# Patient Record
Sex: FEMALE | Race: WHITE | Employment: OTHER | URBAN - METROPOLITAN AREA
[De-identification: names, ages, dates, MRNs, and addresses within clinical notes are randomized per-mention and may not be internally consistent; named-entity substitution may affect disease eponyms.]

---

## 2017-01-18 ENCOUNTER — HOSPITAL ENCOUNTER (OUTPATIENT)
Dept: MAMMOGRAPHY | Age: 47
Discharge: HOME | End: 2017-01-18
Attending: OBSTETRICS & GYNECOLOGY
Payer: COMMERCIAL

## 2017-01-18 DIAGNOSIS — Z12.31 VISIT FOR SCREENING MAMMOGRAM: ICD-10-CM

## 2017-01-18 PROCEDURE — 77067 SCR MAMMO BI INCL CAD: CPT

## 2018-05-15 ENCOUNTER — HOSPITAL ENCOUNTER (OUTPATIENT)
Dept: MAMMOGRAPHY | Age: 48
Discharge: HOME | End: 2018-05-15
Attending: OBSTETRICS & GYNECOLOGY
Payer: COMMERCIAL

## 2018-05-15 DIAGNOSIS — Z12.31 VISIT FOR SCREENING MAMMOGRAM: ICD-10-CM

## 2018-05-15 PROCEDURE — 77067 SCR MAMMO BI INCL CAD: CPT

## 2019-06-14 ENCOUNTER — HOSPITAL ENCOUNTER (OUTPATIENT)
Dept: MAMMOGRAPHY | Age: 49
Discharge: HOME | End: 2019-06-14
Attending: OBSTETRICS & GYNECOLOGY

## 2019-06-14 DIAGNOSIS — Z12.39 BREAST SCREENING, UNSPECIFIED: ICD-10-CM

## 2020-01-03 PROBLEM — R51.9 HEADACHE DISORDER: Status: ACTIVE | Noted: 2020-01-03

## 2020-01-03 PROBLEM — Z79.899 ENCOUNTER FOR MEDICATION MANAGEMENT: Status: ACTIVE | Noted: 2020-01-03

## 2020-01-03 PROBLEM — G47.09 OTHER INSOMNIA: Status: ACTIVE | Noted: 2020-01-03

## 2020-01-03 PROBLEM — G43.111 INTRACTABLE MIGRAINE WITH AURA WITH STATUS MIGRAINOSUS: Status: ACTIVE | Noted: 2020-01-03

## 2020-08-11 ENCOUNTER — HOSPITAL ENCOUNTER (OUTPATIENT)
Dept: MAMMOGRAPHY | Age: 50
Discharge: HOME | End: 2020-08-11
Attending: OBSTETRICS & GYNECOLOGY
Payer: MEDICARE

## 2020-08-11 DIAGNOSIS — Z12.31 VISIT FOR SCREENING MAMMOGRAM: ICD-10-CM

## 2020-08-11 PROCEDURE — 77067 SCR MAMMO BI INCL CAD: CPT

## 2021-01-05 PROBLEM — L65.9 HAIR LOSS: Status: ACTIVE | Noted: 2021-01-05

## 2021-01-05 PROBLEM — Z78.0 POSTMENOPAUSAL: Status: ACTIVE | Noted: 2021-01-05

## 2021-01-05 PROBLEM — E55.9 VITAMIN D DEFICIENCY: Status: ACTIVE | Noted: 2021-01-05

## 2021-05-11 ENCOUNTER — HOSPITAL ENCOUNTER (OUTPATIENT)
Dept: LAB | Age: 51
Discharge: HOME | End: 2021-05-11

## 2021-05-11 PROCEDURE — 88305 TISSUE EXAM BY PATHOLOGIST: CPT

## 2021-07-29 ENCOUNTER — TRANSCRIBE ORDER (OUTPATIENT)
Dept: SCHEDULING | Age: 51
End: 2021-07-29

## 2021-07-29 DIAGNOSIS — Z12.31 VISIT FOR SCREENING MAMMOGRAM: Primary | ICD-10-CM

## 2021-08-26 ENCOUNTER — HOSPITAL ENCOUNTER (OUTPATIENT)
Dept: MAMMOGRAPHY | Age: 51
Discharge: HOME | End: 2021-08-26
Attending: NURSE PRACTITIONER
Payer: MEDICARE

## 2021-08-26 DIAGNOSIS — Z12.31 VISIT FOR SCREENING MAMMOGRAM: ICD-10-CM

## 2021-08-26 PROCEDURE — 77063 BREAST TOMOSYNTHESIS BI: CPT

## 2022-03-18 PROBLEM — R51.9 HEADACHE DISORDER: Status: ACTIVE | Noted: 2020-01-03

## 2022-03-18 PROBLEM — G47.09 OTHER INSOMNIA: Status: ACTIVE | Noted: 2020-01-03

## 2022-03-19 PROBLEM — Z78.0 POSTMENOPAUSAL: Status: ACTIVE | Noted: 2021-01-05

## 2022-03-19 PROBLEM — Z79.899 ENCOUNTER FOR MEDICATION MANAGEMENT: Status: ACTIVE | Noted: 2020-01-03

## 2022-03-19 PROBLEM — G43.111 INTRACTABLE MIGRAINE WITH AURA WITH STATUS MIGRAINOSUS: Status: ACTIVE | Noted: 2020-01-03

## 2022-03-19 PROBLEM — L65.9 HAIR LOSS: Status: ACTIVE | Noted: 2021-01-05

## 2022-03-20 PROBLEM — E55.9 VITAMIN D DEFICIENCY: Status: ACTIVE | Noted: 2021-01-05

## 2022-06-02 DIAGNOSIS — E03.9 PRIMARY HYPOTHYROIDISM: Primary | ICD-10-CM

## 2022-07-27 ENCOUNTER — HOSPITAL ENCOUNTER (OUTPATIENT)
Dept: LAB | Age: 52
Discharge: HOME | End: 2022-07-30

## 2022-07-27 PROCEDURE — 88312 SPECIAL STAINS GROUP 1: CPT

## 2022-07-27 PROCEDURE — 88305 TISSUE EXAM BY PATHOLOGIST: CPT

## 2022-08-03 ENCOUNTER — NURSE ONLY (OUTPATIENT)
Dept: ENDOCRINOLOGY | Age: 52
End: 2022-08-03

## 2022-08-03 DIAGNOSIS — E03.9 PRIMARY HYPOTHYROIDISM: ICD-10-CM

## 2022-08-03 LAB
25(OH)D3 SERPL-MCNC: 34.3 NG/ML (ref 30–100)
T4 FREE SERPL-MCNC: 1.4 NG/DL (ref 0.78–1.46)
TSH, 3RD GENERATION: 1.11 UIU/ML (ref 0.36–3.74)

## 2022-08-10 ENCOUNTER — TELEMEDICINE (OUTPATIENT)
Dept: ENDOCRINOLOGY | Age: 52
End: 2022-08-10
Payer: MEDICARE

## 2022-08-10 DIAGNOSIS — E06.3 HASHIMOTO'S THYROIDITIS: ICD-10-CM

## 2022-08-10 DIAGNOSIS — E55.9 VITAMIN D DEFICIENCY: ICD-10-CM

## 2022-08-10 DIAGNOSIS — E03.9 PRIMARY HYPOTHYROIDISM: Primary | ICD-10-CM

## 2022-08-10 PROCEDURE — 99214 OFFICE O/P EST MOD 30 MIN: CPT | Performed by: INTERNAL MEDICINE

## 2022-08-10 RX ORDER — LEVOTHYROXINE SODIUM 0.07 MG/1
75 TABLET ORAL
Qty: 90 TABLET | Refills: 3 | Status: SHIPPED | OUTPATIENT
Start: 2022-08-10

## 2022-08-10 ASSESSMENT — ENCOUNTER SYMPTOMS
CONSTIPATION: 1
DIARRHEA: 0

## 2022-08-10 NOTE — PROGRESS NOTES
Erika Leon MD, [address] [facility]            Reason for visit: Follow-up of hypothyroidism    I was in the office while conducting this encounter. Consent:  Larry Oleary, who was evaluated through a synchronous (real-time) audio-video encounter, and/or her healthcare decision maker, is aware that it is a billable service, which includes applicable co-pays, with coverage as determined by her insurance carrier. She provided verbal consent to proceed and patient identification was verified. This visit was conducted pursuant to the emergency declaration under the [address] [place] [address], [address] [facility] authority and the [employer] Act. A caregiver was present when appropriate. Ability to conduct physical exam was limited. The patient was located at home in a state where the provider was licensed to provide care. This virtual visit was conducted via [facility] Pursuant to the emergency declaration under the [address] [place] [address], [address] [facility] authority and the [employer] and [employer] Act, this Virtual  Visit was conducted to reduce the patient's risk of exposure to COVID-19 and provide continuity of care for an established patient. Services were provided through a video synchronous discussion virtually to substitute for in-person clinic visit. Due to this being a TeleHealth evaluation, many elements of the physical examination are unable to be assessed. --Bianca Matson MD on 8/10/2022 at 8:50 AM        ASSESSMENT AND PLAN:    1. Primary hypothyroidism  Larry Oleary is biochemically euthyroid. Continue thyroid hormone replacement as prescribed. Residual symptoms are attributable to something other than thyroid dysfunction. Repeat thyroid function tests prior to the next appointment (6 months).   - levothyroxine (SYNTHROID) 75 MCG tablet; Take 1 tablet by mouth every morning (before breakfast)  Dispense: 90 tablet; Refill: 3  - TSH; Future  - T4, Free; Future    2. Hashimoto's thyroiditis    3. Vitamin D deficiency  Vitamin D is replete. Continue vitamin D replacement as prescribed. RTC 6 months.  - vitamin D 25 MCG (1000 UT) CAPS; Take 4 capsules by mouth in the morning. Dispense: 360 capsule; Refill: 3  - Vitamin D 25 Hydroxy; Future    Follow-up and Dispositions    Return in about 6 months (around 2/10/2023). History of Present Illness:    THYROID DYSFUNCTION  Carrington Ramsey was diagnosed with hypothyroidism in  following the birth of her daughter. Symptoms: See review of systems below. Pregnancy / menstrual history: She is A1. Her last menstrual cycle was ~age 43. Prior treatment: She was started on on levothyroxine 75 mcg daily since diagnosis. Her dose has been adjusted as follows:  -100 mcg daily 3/18/2016  -88 mcg daily 2016  -75 mcg daily 2016  -75 mcg daily 6 days per week and 112.5 mcg daily 1 day per week 2016  -75 mcg daily 10/26/2020    Pertinent labs:  10/24/2014: TSH 15.05.  2014: TSH 3.51.  3/18/2015: TSH 4.36.  2016: TSH 9.350.  2016: TSH 0.071, free T4 1.59, antithyroid peroxidase antibodies 22 (+).  2016: TSH 0.049, free T4 1.75.  2016: TSH 4.940, free T4 1. 19.    11/3/2016: TSH 2.270, free T4 1.71.    2017: TSH 0.702, free T4 1.45.  10/11/2017: TSH 2.320, free T4 1.39.  2020: TSH 1.510.  10/26/2020: TSH 0.408.  2021: TSH 2.210, free T4 1.42, 25-hydroxy vitamin D 39.3.  2021: TSH 2.850, free T4 1.41, 25-hydroxy vitamin D 36.0.  2021: TSH 0.577.  2022: TSH 1.390, free T4 2.09, 25-hydroxy vitamin D 39.4.  8/3/2022: TSH 1.110, free T4 1.4, 25-hydroxy vitamin D 34.3. Imaging:  None    Review of Systems   Constitutional:  Positive for fatigue (mild / chronic).  Negative for unexpected weight change (intentionally lost ~4 pounds recently). Cardiovascular:  Negative for palpitations. Gastrointestinal:  Positive for constipation (chronic). Negative for diarrhea. There were no vitals taken for this visit. Wt Readings from Last 3 Encounters:   04/13/22 148 lb (67.1 kg)   08/04/21 145 lb 6.4 oz (66 kg)       Physical Exam  Constitutional:       Appearance: Normal appearance. HENT:      Head: Normocephalic. Nose: Nose normal.   Eyes:      Extraocular Movements: Extraocular movements intact. Pulmonary:      Effort: Pulmonary effort is normal.   Musculoskeletal:         General: Normal range of motion. Right shoulder: Normal.      Left shoulder: Normal.      Cervical back: Normal range of motion. Skin:     Coloration: Skin is not jaundiced or pale. Neurological:      General: No focal deficit present. Mental Status: She is alert. Mental status is at baseline. Psychiatric:         Mood and Affect: Mood normal.         Behavior: Behavior normal.       Orders Placed This Encounter   Procedures    TSH     Standing Status:   Future     Standing Expiration Date:   8/10/2023    T4, Free     Standing Status:   Future     Standing Expiration Date:   8/10/2023    Vitamin D 25 Hydroxy     Standing Status:   Future     Standing Expiration Date:   8/10/2023         Current Outpatient Medications   Medication Sig Dispense Refill    levothyroxine (SYNTHROID) 75 MCG tablet Take 1 tablet by mouth every morning (before breakfast) 90 tablet 3    vitamin D 25 MCG (1000 UT) CAPS Take 4 capsules by mouth in the morning.  360 capsule 3    BABY ASPIRIN PO Take by mouth      ascorbic acid (VITAMIN C) 500 MG tablet Take by mouth      estradiol (ESTRACE) 0.1 MG/GM vaginal cream Place 1 g vaginally Twice a Week      fexofenadine (ALLEGRA) 180 MG tablet Take by mouth daily as needed      guaiFENesin 1200 MG TB12 Take 1,200 mg by mouth 2 times daily      hydroCHLOROthiazide (HYDRODIURIL) 25 MG tablet Take 25 mg by mouth daily      lisinopril (PRINIVIL;ZESTRIL) 10 MG tablet Take 15 mg by mouth 2 times daily      magnesium oxide (MAG-OX) 400 MG tablet Take 400 mg by mouth daily      ondansetron (ZOFRAN-ODT) 8 MG TBDP disintegrating tablet Take 8 mg by mouth 2 times daily as needed      promethazine (PHENERGAN) 25 MG tablet Take 25 mg by mouth every 6 hours as needed      SUMAtriptan (IMITREX) 100 MG tablet Take 100 mg by mouth once as needed       No current facility-administered medications for this visit. Yazan Theodore MD, FACE      Portions of this note were generated with the assistance of voice recognition software. As such, some errors in transcription may be present.

## 2022-11-17 ENCOUNTER — HOSPITAL ENCOUNTER (OUTPATIENT)
Dept: MAMMOGRAPHY | Age: 52
Discharge: HOME | End: 2022-11-20
Payer: MEDICARE

## 2022-11-17 DIAGNOSIS — Z12.31 SCREENING MAMMOGRAM, ENCOUNTER FOR: ICD-10-CM

## 2022-11-17 PROCEDURE — 77067 SCR MAMMO BI INCL CAD: CPT

## 2024-05-01 ENCOUNTER — TRANSCRIBE ORDERS (OUTPATIENT)
Dept: SCHEDULING | Age: 54
End: 2024-05-01

## 2024-05-01 DIAGNOSIS — Z12.31 SCREENING MAMMOGRAM FOR HIGH-RISK PATIENT: Primary | ICD-10-CM

## 2024-05-04 SDOH — ECONOMIC STABILITY: HOUSING INSECURITY
IN THE LAST 12 MONTHS, WAS THERE A TIME WHEN YOU DID NOT HAVE A STEADY PLACE TO SLEEP OR SLEPT IN A SHELTER (INCLUDING NOW)?: PATIENT DECLINED

## 2024-05-04 SDOH — ECONOMIC STABILITY: TRANSPORTATION INSECURITY
IN THE PAST 12 MONTHS, HAS LACK OF TRANSPORTATION KEPT YOU FROM MEETINGS, WORK, OR FROM GETTING THINGS NEEDED FOR DAILY LIVING?: PATIENT DECLINED

## 2024-05-04 SDOH — ECONOMIC STABILITY: FOOD INSECURITY: WITHIN THE PAST 12 MONTHS, YOU WORRIED THAT YOUR FOOD WOULD RUN OUT BEFORE YOU GOT MONEY TO BUY MORE.: PATIENT DECLINED

## 2024-05-04 SDOH — ECONOMIC STABILITY: FOOD INSECURITY: WITHIN THE PAST 12 MONTHS, THE FOOD YOU BOUGHT JUST DIDN'T LAST AND YOU DIDN'T HAVE MONEY TO GET MORE.: PATIENT DECLINED

## 2024-05-04 SDOH — ECONOMIC STABILITY: INCOME INSECURITY: HOW HARD IS IT FOR YOU TO PAY FOR THE VERY BASICS LIKE FOOD, HOUSING, MEDICAL CARE, AND HEATING?: PATIENT DECLINED

## 2024-05-07 ENCOUNTER — OFFICE VISIT (OUTPATIENT)
Dept: OBGYN CLINIC | Age: 54
End: 2024-05-07
Payer: MEDICARE

## 2024-05-07 VITALS
BODY MASS INDEX: 22.19 KG/M2 | WEIGHT: 113 LBS | SYSTOLIC BLOOD PRESSURE: 100 MMHG | DIASTOLIC BLOOD PRESSURE: 68 MMHG | HEIGHT: 60 IN

## 2024-05-07 DIAGNOSIS — Z01.419 ENCOUNTER FOR WELL WOMAN EXAM WITH ROUTINE GYNECOLOGICAL EXAM: Primary | ICD-10-CM

## 2024-05-07 DIAGNOSIS — Z12.4 CERVICAL CANCER SCREENING: ICD-10-CM

## 2024-05-07 DIAGNOSIS — N95.2 VAGINAL ATROPHY: ICD-10-CM

## 2024-05-07 PROCEDURE — G0101 CA SCREEN;PELVIC/BREAST EXAM: HCPCS | Performed by: OBSTETRICS & GYNECOLOGY

## 2024-05-07 PROCEDURE — 3078F DIAST BP <80 MM HG: CPT | Performed by: OBSTETRICS & GYNECOLOGY

## 2024-05-07 PROCEDURE — 3074F SYST BP LT 130 MM HG: CPT | Performed by: OBSTETRICS & GYNECOLOGY

## 2024-05-07 RX ORDER — ESTRADIOL 0.1 MG/G
1 CREAM VAGINAL
Qty: 42.5 G | Refills: 5 | Status: SHIPPED | OUTPATIENT
Start: 2024-05-09

## 2024-05-07 RX ORDER — MIRTAZAPINE 15 MG/1
1 TABLET, FILM COATED ORAL
COMMUNITY

## 2024-05-07 NOTE — PROGRESS NOTES
HPI    Nancy Reddy is a 53 y.o. female seen for annual GYN exam.    Past Medical History, Past Surgical History, Family history, Social History, and Medications were all reviewed with the patient today and updated as necessary.     Current Outpatient Medications   Medication Sig    [START ON 2024] estradiol (ESTRACE) 0.1 MG/GM vaginal cream Place 1 g vaginally Twice a Week    levothyroxine (SYNTHROID) 75 MCG tablet Take 1 tablet by mouth every morning (before breakfast)    vitamin D 25 MCG (1000 UT) CAPS Take 4 capsules by mouth in the morning.    BABY ASPIRIN PO Take by mouth    ascorbic acid (VITAMIN C) 500 MG tablet Take by mouth    fexofenadine (ALLEGRA) 180 MG tablet Take by mouth daily as needed    hydroCHLOROthiazide (HYDRODIURIL) 25 MG tablet Take 1 tablet by mouth daily    lisinopril (PRINIVIL;ZESTRIL) 10 MG tablet Take 1.5 tablets by mouth 2 times daily    magnesium oxide (MAG-OX) 400 MG tablet Take 1 tablet by mouth daily    ondansetron (ZOFRAN-ODT) 8 MG TBDP disintegrating tablet Take 1 tablet by mouth 2 times daily as needed    promethazine (PHENERGAN) 25 MG tablet Take 1 tablet by mouth every 6 hours as needed    SUMAtriptan (IMITREX) 100 MG tablet Take 1 tablet by mouth once as needed    mirtazapine (REMERON) 15 MG tablet Take 1 tablet by mouth nightly     No current facility-administered medications for this visit.     Allergies   Allergen Reactions    Sulfa Antibiotics Hives, Itching, Rash and Swelling    Sulfacetamide Sodium-Sulfur Rash and Itching     Past Medical History:   Diagnosis Date    Atrial septal defect     closure 2021    Chronic serous otitis media     COVID-19 2020    Essential hypertension     Gastroesophageal reflux disease     Hashimoto's thyroiditis     Major depression     Migraines     Primary hypothyroidism      Past Surgical History:   Procedure Laterality Date    ASD REPAIR  2021     SECTION      COLONOSCOPY  2016    COLONOSCOPY  2021

## 2024-05-12 LAB
COLLECTION METHOD: NORMAL
CYTOLOGIST CVX/VAG CYTO: NORMAL
CYTOLOGY CVX/VAG DOC THIN PREP: NORMAL
HPV REFLEX: NORMAL
Lab: NORMAL
OTHER PT INFO: NORMAL
PAP SOURCE: NORMAL
PATH REPORT.FINAL DX SPEC: NORMAL
PREV TREATMENT: NORMAL
STAT OF ADQ CVX/VAG CYTO-IMP: NORMAL

## 2024-06-10 ENCOUNTER — HOSPITAL ENCOUNTER (OUTPATIENT)
Dept: MAMMOGRAPHY | Age: 54
Discharge: HOME | End: 2024-06-13
Payer: MEDICARE

## 2024-06-10 DIAGNOSIS — Z12.31 SCREENING MAMMOGRAM FOR HIGH-RISK PATIENT: ICD-10-CM

## 2024-06-10 PROCEDURE — 77063 BREAST TOMOSYNTHESIS BI: CPT

## 2025-06-13 ENCOUNTER — HOSPITAL ENCOUNTER (OUTPATIENT)
Dept: MAMMOGRAPHY | Age: 55
Discharge: HOME | End: 2025-06-16
Attending: OBSTETRICS & GYNECOLOGY
Payer: MEDICARE

## 2025-06-13 DIAGNOSIS — Z12.31 VISIT FOR SCREENING MAMMOGRAM: ICD-10-CM

## 2025-06-13 PROCEDURE — 77063 BREAST TOMOSYNTHESIS BI: CPT
